# Patient Record
(demographics unavailable — no encounter records)

---

## 2025-01-28 NOTE — PHYSICAL EXAM
[Alert] : alert [No Acute Distress] : no acute distress [Normal Voice/Communication] : normal voice communication [EOMI] : extra ocular movement intact [No Proptosis] : no proptosis [No Lid Lag] : no lid lag [Thyroid Not Enlarged] : the thyroid was not enlarged [No Thyroid Nodules] : no palpable thyroid nodules [No Respiratory Distress] : no respiratory distress [Clear to Auscultation] : lungs were clear to auscultation bilaterally [Normal PMI] : the apical impulse was normal [Normal Rate] : heart rate was normal [Regular Rhythm] : with a regular rhythm [No Edema] : no peripheral edema [Normal Bowel Sounds] : normal bowel sounds [Soft] : abdomen soft [Normal Gait] : normal gait [No Rash] : no rash [Normal Reflexes] : deep tendon reflexes were 2+ and symmetric [No Tremors] : no tremors [Oriented x3] : oriented to person, place, and time [Normal Insight/Judgement] : insight and judgment were intact

## 2025-01-28 NOTE — ASSESSMENT
[Levothyroxine] : The patient was instructed to take Levothyroxine on an empty stomach, separate from vitamins, and wait at least 30 minutes before eating [FreeTextEntry1] : Patient had evidence of hyperthyroidism based on his prior  blood work which was most likely related to taking a ICI ( immune checkpoint inhibitor). causing immune related thyroiditis ( iRT) .  Sonogram of thyroid did not show any nodules, RAIU was 0 % . Subsequently developed hypothyroidism. Now on Levothyroxine 125; will check labs today consider tapering down.

## 2025-01-28 NOTE — HISTORY OF PRESENT ILLNESS
[FreeTextEntry1] : The patient was referred here for evaluation of his thyroid function which initially showed a suppressed TSH and on retesting showed an elevated T4 and T3 and suppressed TSH compatible with hyperthyroidism. Most ;likely related to  starting Opdivo  treatment for melanoma. He was noticing episodes of sweats, insomnia,  palpitations and tremors  however he is on a beta blocker for hypertension which can block some of these symptoms. . .These symptoms have resolved. Subsequently developed hypothyroidism and is now on levothyroxine 125 ug OD. He says no longer on Rx for melanoma. He lost weight due to difficulty swallowing from achalasia and had surgery; still having some difficulty. Has also been diagnosed with Lyme.

## 2025-07-15 NOTE — ASSESSMENT
[Levothyroxine] : The patient was instructed to take Levothyroxine on an empty stomach, separate from vitamins, and wait at least 30 minutes before eating [FreeTextEntry1] : Patient had evidence of hyperthyroidism based on his prior  blood work which was most likely related to taking a ICI ( immune checkpoint inhibitor). causing immune related thyroiditis ( iRT) .  Sonogram of thyroid did not show any nodules, RAIU was 0 % . Subsequently developed hypothyroidism. Now on Levothyroxine 112; has been on 125 a week so we will order levothyroxine 112 and we will check labs at the end of August.

## 2025-07-15 NOTE — HISTORY OF PRESENT ILLNESS
[FreeTextEntry1] : The patient was referred here for evaluation of his thyroid function which initially showed a suppressed TSH and on retesting showed an elevated T4 and T3 and suppressed TSH compatible with hyperthyroidism. Most ;likely related to  starting Opdivo  treatment for melanoma. He was noticing episodes of sweats, insomnia,  palpitations and tremors  however he is on a beta blocker for hypertension which can block some of these symptoms. . .These symptoms have resolved. Subsequently developed hypothyroidism and is now on levothyroxine 112 ug OD. He says no longer on Rx for melanoma. He lost weight due to difficulty swallowing from achalasia and had surgery; still having some difficulty. Has also been diagnosed with Lyme.  He ran out of the levothyroxine 112 show over the past week has been taking 125.

## 2025-07-15 NOTE — PHYSICAL EXAM
[Alert] : alert [Well Nourished] : well nourished [No Acute Distress] : no acute distress [Normal Voice/Communication] : normal voice communication [Normal Sclera/Conjunctiva] : normal sclera/conjunctiva [Normal Outer Ear/Nose] : the ears and nose were normal in appearance [No Neck Mass] : no neck mass was observed [No Respiratory Distress] : no respiratory distress [Normal Gait] : normal gait [Oriented x3] : oriented to person, place, and time [Normal Affect] : the affect was normal [Normal Insight/Judgement] : insight and judgment were intact